# Patient Record
Sex: MALE | Race: WHITE | Employment: FULL TIME | URBAN - METROPOLITAN AREA
[De-identification: names, ages, dates, MRNs, and addresses within clinical notes are randomized per-mention and may not be internally consistent; named-entity substitution may affect disease eponyms.]

---

## 2020-09-11 ENCOUNTER — HOSPITAL ENCOUNTER (OUTPATIENT)
Dept: MRI IMAGING | Age: 35
Discharge: HOME | End: 2020-09-13
Payer: COMMERCIAL

## 2020-09-16 ENCOUNTER — HOSPITAL ENCOUNTER (OUTPATIENT)
Dept: INTERVENTIONAL RADIOLOGY/VASCULAR | Age: 35
Discharge: HOME | End: 2020-09-18
Payer: COMMERCIAL

## 2020-09-16 ENCOUNTER — HOSPITAL ENCOUNTER (OUTPATIENT)
Dept: MRI IMAGING | Age: 35
Discharge: HOME | End: 2020-09-18
Payer: COMMERCIAL

## 2020-09-16 VITALS — HEART RATE: 69 BPM | DIASTOLIC BLOOD PRESSURE: 62 MMHG | SYSTOLIC BLOOD PRESSURE: 124 MMHG | RESPIRATION RATE: 18 BRPM

## 2020-09-16 PROCEDURE — 77002 NEEDLE LOCALIZATION BY XRAY: CPT

## 2020-09-16 PROCEDURE — A9579 GAD-BASE MR CONTRAST NOS,1ML: HCPCS | Performed by: RADIOLOGY

## 2020-09-16 PROCEDURE — 73222 MRI JOINT UPR EXTREM W/DYE: CPT

## 2020-09-16 PROCEDURE — 6360000004 HC RX CONTRAST MEDICATION: Performed by: RADIOLOGY

## 2020-09-16 PROCEDURE — 23350 INJECTION FOR SHOULDER X-RAY: CPT

## 2020-09-16 PROCEDURE — 2709999900 IR ARTHR/ASP/INJ MAJOR JT/BURSA RIGHT WO US

## 2020-09-16 RX ADMIN — IOPAMIDOL 15 ML: 612 INJECTION, SOLUTION INTRAVENOUS at 09:07

## 2020-09-16 RX ADMIN — GADOTERIDOL 0.2 ML: 279.3 INJECTION, SOLUTION INTRAVENOUS at 09:07

## 2020-09-16 NOTE — BRIEF OP NOTE
Brief Postoperative Note    Chris Hamm  YOB: 1985  [id number]    Pre-operative Diagnosis: R shoulder pain, multiple dislocations    Post-operative Diagnosis: Same    Procedure: R shoulder arthrogram, pre-MRI    Anesthesia: Local    Surgeons/Assistants: FRANKIE VIERA    Estimated Blood Loss: less than 1mL     Complications: None    Specimens: Was Not Obtained    Findings: successful R shoulder arthrogram    Electronically signed by Annabel Whelan on 9/16/2020 at 12:09 PM